# Patient Record
Sex: MALE | Race: OTHER | Employment: STUDENT | URBAN - METROPOLITAN AREA
[De-identification: names, ages, dates, MRNs, and addresses within clinical notes are randomized per-mention and may not be internally consistent; named-entity substitution may affect disease eponyms.]

---

## 2017-01-06 ENCOUNTER — OFFICE VISIT (OUTPATIENT)
Dept: FAMILY MEDICINE CLINIC | Facility: CLINIC | Age: 27
End: 2017-01-06

## 2017-01-06 VITALS
TEMPERATURE: 99 F | RESPIRATION RATE: 20 BRPM | BODY MASS INDEX: 25.34 KG/M2 | SYSTOLIC BLOOD PRESSURE: 127 MMHG | HEART RATE: 82 BPM | HEIGHT: 71 IN | DIASTOLIC BLOOD PRESSURE: 80 MMHG | WEIGHT: 181 LBS

## 2017-01-06 DIAGNOSIS — S89.92XD LEFT KNEE INJURY, SUBSEQUENT ENCOUNTER: Primary | ICD-10-CM

## 2017-01-06 PROCEDURE — 99212 OFFICE O/P EST SF 10 MIN: CPT | Performed by: FAMILY MEDICINE

## 2017-01-06 PROCEDURE — 99214 OFFICE O/P EST MOD 30 MIN: CPT | Performed by: FAMILY MEDICINE

## 2017-01-06 RX ORDER — DICLOFENAC SODIUM 75 MG/1
75 TABLET, DELAYED RELEASE ORAL 2 TIMES DAILY
Qty: 60 TABLET | Refills: 1 | Status: SHIPPED | OUTPATIENT
Start: 2017-01-06 | End: 2018-01-06

## 2017-01-06 NOTE — PROGRESS NOTES
HPI: 5-6 weeks ago injury playing basketball since then has not had much of an improvement. He states he is <90% improved. Unable to participate in vigorous exercise.   Mild stiffness no swelling    ROS: Left knee lateral tenderness difficulty deep knee b

## 2017-01-17 ENCOUNTER — TELEPHONE (OUTPATIENT)
Dept: FAMILY MEDICINE CLINIC | Facility: CLINIC | Age: 27
End: 2017-01-17

## 2017-01-19 ENCOUNTER — TELEPHONE (OUTPATIENT)
Dept: FAMILY MEDICINE CLINIC | Facility: CLINIC | Age: 27
End: 2017-01-19

## 2017-01-19 NOTE — TELEPHONE ENCOUNTER
[facility] is requesting Peer to peer for MRI at [phone] Tracking# [id number]. Thank you, [employer].

## 2017-01-19 NOTE — TELEPHONE ENCOUNTER
Let him know that his insurance co. Is requiring me to call to discuss the order for approval.  I cannot get to it today. Hope to call tomorrow.

## 2017-01-19 NOTE — TELEPHONE ENCOUNTER
Patient calling to get status on referral for MRI of his left knee. See referral start date of 1/06/2017 [facility]#[id number]  Patient requesting a call back as soon as possible   He has the appt.  For the MRI on Monday 1/23 at 7:30AM     Please call to discuss

## 2017-01-20 NOTE — TELEPHONE ENCOUNTER
Pt calling back informed [employer] message below,  Pt requesting a call back today if possible to confirm if [employer] was able to get authorization since pt states he has apt Monday early morning.

## 2017-01-20 NOTE — TELEPHONE ENCOUNTER
Lmtcb.     CAMILLE, please inform pt of MD message below. Dr. Venus Leo, please let us know once you have contacted the insurance and the outcome. Thanks.

## 2017-01-24 DIAGNOSIS — S89.92XD LEFT KNEE INJURY, SUBSEQUENT ENCOUNTER: Primary | ICD-10-CM

## 2017-01-24 NOTE — TELEPHONE ENCOUNTER
Since I cannot get through to the insurance within reasonable period of time, I recommend a referrral to see Dr. Jose Varela or Mike Hernandes

## 2017-01-25 NOTE — TELEPHONE ENCOUNTER
Pt aware, pt will schedule appt. With [facility]. Pt was driving and was unable to write down number. PPA when pt calls back please give him [facility] number [phone].  Thanks.

## 2020-03-03 ENCOUNTER — OFFICE VISIT (OUTPATIENT)
Dept: FAMILY MEDICINE CLINIC | Facility: CLINIC | Age: 30
End: 2020-03-03
Payer: OTHER MISCELLANEOUS

## 2020-03-03 VITALS
WEIGHT: 172 LBS | BODY MASS INDEX: 24.08 KG/M2 | SYSTOLIC BLOOD PRESSURE: 115 MMHG | HEIGHT: 71 IN | DIASTOLIC BLOOD PRESSURE: 77 MMHG | TEMPERATURE: 98 F | HEART RATE: 66 BPM

## 2020-03-03 DIAGNOSIS — S83.412A SPRAIN OF MEDIAL COLLATERAL LIGAMENT OF LEFT KNEE, INITIAL ENCOUNTER: Primary | ICD-10-CM

## 2020-03-03 PROCEDURE — 99213 OFFICE O/P EST LOW 20 MIN: CPT | Performed by: FAMILY MEDICINE

## 2020-03-03 PROCEDURE — 99212 OFFICE O/P EST SF 10 MIN: CPT | Performed by: FAMILY MEDICINE

## 2020-03-03 RX ORDER — DICLOFENAC SODIUM 75 MG/1
75 TABLET, DELAYED RELEASE ORAL 2 TIMES DAILY
Qty: 30 TABLET | Refills: 1 | Status: SHIPPED | OUTPATIENT
Start: 2020-03-03

## 2020-03-03 NOTE — PROGRESS NOTES
HPI:    Patient ID: Flip Watt is a 34year old male. HPI  Patient presents with:  Knee Pain: LT knee pain, no stability,  swelling from walking due to walking at work, since 2/24/2020    Review of Systems   Constitutional: Negative.     Musculoske

## 2020-03-15 ENCOUNTER — NURSE TRIAGE (OUTPATIENT)
Dept: FAMILY MEDICINE CLINIC | Facility: CLINIC | Age: 30
End: 2020-03-15

## 2020-03-16 RX ORDER — BENZONATATE 200 MG/1
200 CAPSULE ORAL 3 TIMES DAILY PRN
Qty: 21 CAPSULE | Refills: 0 | Status: SHIPPED | OUTPATIENT
Start: 2020-03-16

## 2020-03-16 NOTE — TELEPHONE ENCOUNTER
Your patient scheduled appointment via [address] for 3/17 with cough,     Please contact patient to determine if they should be treated over the phone by you or requires further testing.   Please refer to the \"Testing and Travel Recommendations\" provided by

## 2020-03-16 NOTE — TELEPHONE ENCOUNTER
Action Requested: Summary for Provider     []  Critical Lab, Recommendations Needed  [x] Need Additional Advice  []   FYI    []   Need Orders  [] Need Medications Sent to Pharmacy  []  Other     SUMMARY: Patient calling with complaint of cough that stared

## 2024-05-07 ENCOUNTER — ANESTHESIA (OUTPATIENT)
Dept: SURGERY | Age: 34
End: 2024-05-07

## 2024-05-07 ENCOUNTER — ANESTHESIA EVENT (OUTPATIENT)
Dept: SURGERY | Age: 34
End: 2024-05-07

## 2024-05-07 ENCOUNTER — HOSPITAL ENCOUNTER (OUTPATIENT)
Age: 34
Discharge: HOME | End: 2024-05-07
Attending: ORTHOPAEDIC SURGERY | Admitting: ORTHOPAEDIC SURGERY

## 2024-05-07 DIAGNOSIS — G89.18 ACUTE POST-OPERATIVE PAIN: Primary | ICD-10-CM

## 2024-05-07 PROCEDURE — 13000003 HB ANESTHESIA  GENERAL EA ADD MINUTE: Performed by: ORTHOPAEDIC SURGERY

## 2024-05-07 PROCEDURE — 10002800 HB RX 250 W HCPCS: Performed by: ANESTHESIOLOGY

## 2024-05-07 PROCEDURE — 10006023 HB SUPPLY 272: Performed by: ORTHOPAEDIC SURGERY

## 2024-05-07 PROCEDURE — 13000002 HB ANESTHESIA  GENERAL  S/U + 1ST 15 MIN: Performed by: ORTHOPAEDIC SURGERY

## 2024-05-07 PROCEDURE — 10004451 HB PACU RECOVERY 1ST 30 MINUTES: Performed by: ORTHOPAEDIC SURGERY

## 2024-05-07 PROCEDURE — 10002807 HB RX 258: Performed by: ANESTHESIOLOGY

## 2024-05-07 PROCEDURE — 10002800 HB RX 250 W HCPCS: Performed by: ORTHOPAEDIC SURGERY

## 2024-05-07 PROCEDURE — 13000117 HB ORTHO COMPLEX CASE EA ADD MINUTE: Performed by: ORTHOPAEDIC SURGERY

## 2024-05-07 PROCEDURE — 10002801 HB RX 250 W/O HCPCS: Performed by: ANESTHESIOLOGY

## 2024-05-07 PROCEDURE — 10002801 HB RX 250 W/O HCPCS: Performed by: ORTHOPAEDIC SURGERY

## 2024-05-07 PROCEDURE — 13000116 HB ORTHO COMPLEX CASE S/U + 1ST 15 MIN: Performed by: ORTHOPAEDIC SURGERY

## 2024-05-07 PROCEDURE — 10002803 HB RX 637: Performed by: ANESTHESIOLOGY

## 2024-05-07 PROCEDURE — 10004452 HB PACU ADDL 30 MINUTES: Performed by: ORTHOPAEDIC SURGERY

## 2024-05-07 PROCEDURE — 13000001 HB PHASE II RECOVERY EA 30 MINUTES: Performed by: ORTHOPAEDIC SURGERY

## 2024-05-07 RX ORDER — NICOTINE POLACRILEX 4 MG
30 LOZENGE BUCCAL
Status: DISCONTINUED | OUTPATIENT
Start: 2024-05-07 | End: 2024-05-07 | Stop reason: HOSPADM

## 2024-05-07 RX ORDER — SODIUM CHLORIDE, SODIUM LACTATE, POTASSIUM CHLORIDE, CALCIUM CHLORIDE 600; 310; 30; 20 MG/100ML; MG/100ML; MG/100ML; MG/100ML
INJECTION, SOLUTION INTRAVENOUS CONTINUOUS PRN
Status: DISCONTINUED | OUTPATIENT
Start: 2024-05-07 | End: 2024-05-07

## 2024-05-07 RX ORDER — LIDOCAINE HYDROCHLORIDE 10 MG/ML
INJECTION, SOLUTION EPIDURAL; INFILTRATION; INTRACAUDAL; PERINEURAL PRN
Status: DISCONTINUED | OUTPATIENT
Start: 2024-05-07 | End: 2024-05-07

## 2024-05-07 RX ORDER — METHYLPREDNISOLONE 4 MG/1
4 TABLET ORAL SEE ADMIN INSTRUCTIONS
Qty: 21 TABLET | Refills: 0 | Status: SHIPPED | OUTPATIENT
Start: 2024-05-07

## 2024-05-07 RX ORDER — DIPHENHYDRAMINE HYDROCHLORIDE 50 MG/ML
12.5 INJECTION INTRAMUSCULAR; INTRAVENOUS
Status: DISCONTINUED | OUTPATIENT
Start: 2024-05-07 | End: 2024-05-07 | Stop reason: HOSPADM

## 2024-05-07 RX ORDER — HYDRALAZINE HYDROCHLORIDE 20 MG/ML
5 INJECTION INTRAMUSCULAR; INTRAVENOUS EVERY 10 MIN PRN
Status: DISCONTINUED | OUTPATIENT
Start: 2024-05-07 | End: 2024-05-07 | Stop reason: HOSPADM

## 2024-05-07 RX ORDER — DEXTROSE MONOHYDRATE 25 G/50ML
25 INJECTION, SOLUTION INTRAVENOUS PRN
Status: DISCONTINUED | OUTPATIENT
Start: 2024-05-07 | End: 2024-05-07 | Stop reason: HOSPADM

## 2024-05-07 RX ORDER — MAGNESIUM HYDROXIDE 1200 MG/15ML
LIQUID ORAL PRN
Status: DISCONTINUED | OUTPATIENT
Start: 2024-05-07 | End: 2024-05-07 | Stop reason: HOSPADM

## 2024-05-07 RX ORDER — HYDROCODONE BITARTRATE AND ACETAMINOPHEN 5; 325 MG/1; MG/1
1 TABLET ORAL EVERY 6 HOURS PRN
Qty: 10 TABLET | Refills: 0 | Status: SHIPPED | OUTPATIENT
Start: 2024-05-07

## 2024-05-07 RX ORDER — DIPHENHYDRAMINE HYDROCHLORIDE 50 MG/ML
25 INJECTION INTRAMUSCULAR; INTRAVENOUS EVERY 4 HOURS PRN
Status: DISCONTINUED | OUTPATIENT
Start: 2024-05-07 | End: 2024-05-07 | Stop reason: HOSPADM

## 2024-05-07 RX ORDER — DIPHENHYDRAMINE HYDROCHLORIDE 50 MG/ML
12.5 INJECTION INTRAMUSCULAR; INTRAVENOUS EVERY 4 HOURS PRN
Status: DISCONTINUED | OUTPATIENT
Start: 2024-05-07 | End: 2024-05-07 | Stop reason: HOSPADM

## 2024-05-07 RX ORDER — ONDANSETRON 2 MG/ML
INJECTION INTRAMUSCULAR; INTRAVENOUS PRN
Status: DISCONTINUED | OUTPATIENT
Start: 2024-05-07 | End: 2024-05-07

## 2024-05-07 RX ORDER — METOCLOPRAMIDE HYDROCHLORIDE 5 MG/ML
5 INJECTION INTRAMUSCULAR; INTRAVENOUS EVERY 6 HOURS PRN
Status: DISCONTINUED | OUTPATIENT
Start: 2024-05-07 | End: 2024-05-07 | Stop reason: HOSPADM

## 2024-05-07 RX ORDER — MIDAZOLAM HYDROCHLORIDE 1 MG/ML
INJECTION, SOLUTION INTRAMUSCULAR; INTRAVENOUS PRN
Status: DISCONTINUED | OUTPATIENT
Start: 2024-05-07 | End: 2024-05-07

## 2024-05-07 RX ORDER — DEXAMETHASONE SODIUM PHOSPHATE 4 MG/ML
INJECTION, SOLUTION INTRA-ARTICULAR; INTRALESIONAL; INTRAMUSCULAR; INTRAVENOUS; SOFT TISSUE PRN
Status: DISCONTINUED | OUTPATIENT
Start: 2024-05-07 | End: 2024-05-07

## 2024-05-07 RX ORDER — PROPOFOL 10 MG/ML
INJECTION, EMULSION INTRAVENOUS PRN
Status: DISCONTINUED | OUTPATIENT
Start: 2024-05-07 | End: 2024-05-07

## 2024-05-07 RX ORDER — ONDANSETRON 2 MG/ML
4 INJECTION INTRAMUSCULAR; INTRAVENOUS 2 TIMES DAILY PRN
Status: DISCONTINUED | OUTPATIENT
Start: 2024-05-07 | End: 2024-05-07 | Stop reason: HOSPADM

## 2024-05-07 RX ORDER — HYDROCODONE BITARTRATE AND ACETAMINOPHEN 5; 325 MG/1; MG/1
1 TABLET ORAL ONCE
Status: COMPLETED | OUTPATIENT
Start: 2024-05-07 | End: 2024-05-07

## 2024-05-07 RX ORDER — BUPIVACAINE HYDROCHLORIDE AND EPINEPHRINE 2.5; 5 MG/ML; UG/ML
INJECTION, SOLUTION EPIDURAL; INFILTRATION; INTRACAUDAL; PERINEURAL PRN
Status: DISCONTINUED | OUTPATIENT
Start: 2024-05-07 | End: 2024-05-07 | Stop reason: HOSPADM

## 2024-05-07 RX ORDER — MELOXICAM 7.5 MG/1
7.5 TABLET ORAL DAILY
Qty: 30 TABLET | Refills: 0 | Status: SHIPPED | OUTPATIENT
Start: 2024-05-07

## 2024-05-07 RX ADMIN — SODIUM CHLORIDE, POTASSIUM CHLORIDE, SODIUM LACTATE AND CALCIUM CHLORIDE: 600; 310; 30; 20 INJECTION, SOLUTION INTRAVENOUS at 11:34

## 2024-05-07 RX ADMIN — CEFAZOLIN SODIUM 2000 MG: 300 INJECTION, POWDER, LYOPHILIZED, FOR SOLUTION INTRAVENOUS at 11:49

## 2024-05-07 RX ADMIN — FENTANYL CITRATE 50 MCG: 50 INJECTION INTRAMUSCULAR; INTRAVENOUS at 11:41

## 2024-05-07 RX ADMIN — MIDAZOLAM HYDROCHLORIDE 2 MG: 1 INJECTION, SOLUTION INTRAMUSCULAR; INTRAVENOUS at 11:34

## 2024-05-07 RX ADMIN — LIDOCAINE HYDROCHLORIDE 5 ML: 10 INJECTION, SOLUTION EPIDURAL; INFILTRATION; INTRACAUDAL; PERINEURAL at 11:41

## 2024-05-07 RX ADMIN — FENTANYL CITRATE 50 MCG: 50 INJECTION INTRAMUSCULAR; INTRAVENOUS at 12:00

## 2024-05-07 RX ADMIN — DEXAMETHASONE SODIUM PHOSPHATE 8 MG: 4 INJECTION INTRA-ARTICULAR; INTRALESIONAL; INTRAMUSCULAR; INTRAVENOUS; SOFT TISSUE at 11:41

## 2024-05-07 RX ADMIN — KETOROLAC TROMETHAMINE 15 MG: 30 INJECTION, SOLUTION INTRAMUSCULAR at 12:17

## 2024-05-07 RX ADMIN — HYDROCODONE BITARTRATE AND ACETAMINOPHEN 1 TABLET: 5; 325 TABLET ORAL at 13:17

## 2024-05-07 RX ADMIN — ONDANSETRON 4 MG: 2 INJECTION INTRAMUSCULAR; INTRAVENOUS at 11:41

## 2024-05-07 RX ADMIN — PROPOFOL 200 MG: 10 INJECTION, EMULSION INTRAVENOUS at 11:41

## 2024-05-07 ASSESSMENT — PAIN SCALES - WONG BAKER
WONGBAKER_NUMERICALRESPONSE: 0

## 2024-05-07 ASSESSMENT — PAIN SCALES - GENERAL
PAINLEVEL_OUTOF10: 6
PAINLEVEL_OUTOF10: 0
PAINLEVEL_OUTOF10: 4

## 2024-05-07 ASSESSMENT — ENCOUNTER SYMPTOMS: EXERCISE TOLERANCE: GOOD (>4 METS)

## 2024-05-08 VITALS
SYSTOLIC BLOOD PRESSURE: 138 MMHG | OXYGEN SATURATION: 97 % | WEIGHT: 187.39 LBS | BODY MASS INDEX: 26.83 KG/M2 | DIASTOLIC BLOOD PRESSURE: 85 MMHG | RESPIRATION RATE: 16 BRPM | TEMPERATURE: 97 F | HEIGHT: 70 IN | HEART RATE: 60 BPM

## (undated) DEVICE — GLOVE SURG 8 PROTEXIS PI LF CRM PF BEAD CUFF STRL PLISPRN

## (undated) DEVICE — GLOVE SURG 7 PROTEXIS LF BLUE PF SMTH BEAD CUFF INTLK STRL

## (undated) DEVICE — GLOVE SURG 8 PROTEXIS LF BLUE PF SMTH BEAD CUFF INTLK STRL

## (undated) DEVICE — GLOVE SURG 6.5 PROTEXIS PI LF CRM PF BEAD CUFF STRL PLISPRN

## (undated) DEVICE — BLANKET WRM UPR BODY ADULT 74X24IN BR HGR PLMR 2 HOSE PORT

## (undated) DEVICE — CUTTER ENDO 3.5MM RESCT SHVR BLADE STRL 125MM LF DISP

## (undated) DEVICE — Device

## (undated) DEVICE — BLADE SHVR 5.5MM STRGT STRL LF DISP

## (undated) DEVICE — DRESSING PETRO 9X5IN NADH OCL IMPREGNATE CRD XEROFORM CTN

## (undated) DEVICE — GOWN SURG XL L4 RAGLAN SLV BRTHBL STRL LF DISP SMARTGOWN

## (undated) DEVICE — GLOVE SURG 7.5 PROTEXIS PI LF CRM PF BEAD CUFF STRL PLISPRN

## (undated) DEVICE — 2% CHLORHEXIDINE SKIN PREP ORANGE 26ML

## (undated) DEVICE — PUMP TUBING 13FT CONTINUOUS WV 3 DUALWAVE ARTHRO STRL DISP

## (undated) DEVICE — BLADE SHVR 4MM 125MM FORMULA AGRS+ STRL LF DISP ARTHRO

## (undated) DEVICE — SUTURE MONOCRYL MTPS 3-0 PS2 18IN MONO ABS UNDYED

## (undated) DEVICE — BANDAGE CMPR ESMK 9FTX6IN SMTH FNSH ELASTIC STRL LF

## (undated) DEVICE — CUFF TRNQT 1 BLDR 2 PORT QC CNCT CLR NS LF DISP

## (undated) NOTE — MR AVS SNAPSHOT
[place] [address] [place]  [id number] [phone]               Thank you for choosing us for your health care visit with Arminda Zaldivar DO.   We are glad to serve you and happy to provide you with this summary of between 7:30am to 6pm and on Saturday between 8am and 1pm.  Evening and weekend appointments for your exam are available. [facility]ly [facility], [facility] ([address] [place] [address])  [address]    [place], [place]                            MRI Wide Bore A Tips for increasing your physical activity – Adults who are physically active are less likely to develop some chronic diseases than adults who are inactive.      HOW TO GET STARTED: HOW TO STAY MOTIVATED:   Start activities slowly and build up over time Do